# Patient Record
Sex: FEMALE | Race: WHITE | Employment: OTHER | URBAN - NONMETROPOLITAN AREA
[De-identification: names, ages, dates, MRNs, and addresses within clinical notes are randomized per-mention and may not be internally consistent; named-entity substitution may affect disease eponyms.]

---

## 2018-10-31 ENCOUNTER — HOSPITAL ENCOUNTER (OUTPATIENT)
Age: 58
Discharge: HOME | End: 2018-10-31
Payer: COMMERCIAL

## 2018-10-31 ENCOUNTER — OFFICE VISIT (OUTPATIENT)
Dept: FAMILY MEDICINE CLINIC | Age: 58
End: 2018-10-31
Payer: COMMERCIAL

## 2018-10-31 VITALS
HEIGHT: 64 IN | HEART RATE: 100 BPM | WEIGHT: 185 LBS | DIASTOLIC BLOOD PRESSURE: 80 MMHG | TEMPERATURE: 97.6 F | OXYGEN SATURATION: 98 % | BODY MASS INDEX: 31.58 KG/M2 | RESPIRATION RATE: 10 BRPM | SYSTOLIC BLOOD PRESSURE: 128 MMHG

## 2018-10-31 DIAGNOSIS — J45.20 MILD INTERMITTENT ASTHMA WITHOUT COMPLICATION: ICD-10-CM

## 2018-10-31 DIAGNOSIS — R20.2 NUMBNESS AND TINGLING OF UPPER AND LOWER EXTREMITIES OF BOTH SIDES: ICD-10-CM

## 2018-10-31 DIAGNOSIS — D72.10 EOSINOPHILIC DISORDER: ICD-10-CM

## 2018-10-31 DIAGNOSIS — T14.8XXA BRUISING: ICD-10-CM

## 2018-10-31 DIAGNOSIS — M79.605 PAIN IN BOTH LOWER EXTREMITIES: ICD-10-CM

## 2018-10-31 DIAGNOSIS — K90.9 DIARRHEA DUE TO MALABSORPTION: ICD-10-CM

## 2018-10-31 DIAGNOSIS — R41.3 MEMORY DIFFICULTIES: ICD-10-CM

## 2018-10-31 DIAGNOSIS — I10 BENIGN ESSENTIAL HYPERTENSION: ICD-10-CM

## 2018-10-31 DIAGNOSIS — G47.9 SLEEP DIFFICULTIES: ICD-10-CM

## 2018-10-31 DIAGNOSIS — F51.01 PRIMARY INSOMNIA: ICD-10-CM

## 2018-10-31 DIAGNOSIS — R68.83 CHILLS: Primary | ICD-10-CM

## 2018-10-31 DIAGNOSIS — L53.9 REDNESS: ICD-10-CM

## 2018-10-31 DIAGNOSIS — R11.0 NAUSEA: ICD-10-CM

## 2018-10-31 DIAGNOSIS — R20.0 NUMBNESS AND TINGLING OF UPPER AND LOWER EXTREMITIES OF BOTH SIDES: ICD-10-CM

## 2018-10-31 DIAGNOSIS — J30.1 SEASONAL ALLERGIC RHINITIS DUE TO POLLEN: ICD-10-CM

## 2018-10-31 DIAGNOSIS — K20.90 ESOPHAGITIS: ICD-10-CM

## 2018-10-31 DIAGNOSIS — E78.5 DYSLIPIDEMIA: ICD-10-CM

## 2018-10-31 DIAGNOSIS — R68.83 CHILLS: ICD-10-CM

## 2018-10-31 DIAGNOSIS — M16.11 PRIMARY OSTEOARTHRITIS OF RIGHT HIP: ICD-10-CM

## 2018-10-31 DIAGNOSIS — M79.604 PAIN IN BOTH LOWER EXTREMITIES: ICD-10-CM

## 2018-10-31 DIAGNOSIS — E55.9 VITAMIN D DEFICIENCY: ICD-10-CM

## 2018-10-31 DIAGNOSIS — R19.7 DIARRHEA DUE TO MALABSORPTION: ICD-10-CM

## 2018-10-31 DIAGNOSIS — Q80.8 PEELING SKIN SYNDROME: ICD-10-CM

## 2018-10-31 DIAGNOSIS — M48.07 SPINAL STENOSIS OF LUMBOSACRAL REGION: ICD-10-CM

## 2018-10-31 PROBLEM — J30.2 SEASONAL ALLERGIC RHINITIS: Status: ACTIVE | Noted: 2018-07-31

## 2018-10-31 PROBLEM — G47.00 INSOMNIA: Status: ACTIVE | Noted: 2018-10-31

## 2018-10-31 PROBLEM — E11.9 TYPE 2 DIABETES MELLITUS WITHOUT COMPLICATION (HCC): Status: ACTIVE | Noted: 2018-10-31

## 2018-10-31 LAB
ALBUMIN SERPL-MCNC: 4.4 G/DL (ref 3.5–5.1)
ALP BLD-CCNC: 89 U/L (ref 38–126)
ALT SERPL-CCNC: 49 U/L (ref 11–66)
AMYLASE: 95 U/L (ref 20–104)
ANION GAP SERPL CALCULATED.3IONS-SCNC: 16 MEQ/L (ref 8–16)
AST SERPL-CCNC: 28 U/L (ref 5–40)
AVERAGE GLUCOSE: 126 MG/DL (ref 70–126)
BASOPHILS # BLD: 0.5 %
BASOPHILS ABSOLUTE: 0.1 THOU/MM3 (ref 0–0.1)
BILIRUB SERPL-MCNC: 0.2 MG/DL (ref 0.3–1.2)
BILIRUBIN DIRECT: < 0.2 MG/DL (ref 0–0.3)
BILIRUBIN URINE: NEGATIVE
BLOOD, URINE: NEGATIVE
BUN BLDV-MCNC: 12 MG/DL (ref 7–22)
CALCIUM SERPL-MCNC: 9.8 MG/DL (ref 8.5–10.5)
CARBON MONOXIDE, BLOOD: 5.4 % CO SAT
CHARACTER, URINE: CLEAR
CHLORIDE BLD-SCNC: 102 MEQ/L (ref 98–111)
CHOLESTEROL, TOTAL: 253 MG/DL (ref 100–199)
CO2: 22 MEQ/L (ref 23–33)
COLOR: YELLOW
CREAT SERPL-MCNC: 0.7 MG/DL (ref 0.4–1.2)
EOSINOPHIL # BLD: 0.6 %
EOSINOPHILS ABSOLUTE: 0.1 THOU/MM3 (ref 0–0.4)
ERYTHROCYTE [DISTWIDTH] IN BLOOD BY AUTOMATED COUNT: 14.7 % (ref 11.5–14.5)
ERYTHROCYTE [DISTWIDTH] IN BLOOD BY AUTOMATED COUNT: 40.6 FL (ref 35–45)
ESTRADIOL LEVEL: 9.2 PG/ML
FOLLICLE STIMULATING HORMONE: 65.1 MIU/ML (ref 16–160)
GFR SERPL CREATININE-BSD FRML MDRD: 85 ML/MIN/1.73M2
GLUCOSE BLD-MCNC: 99 MG/DL (ref 70–108)
GLUCOSE URINE: NEGATIVE MG/DL
HBA1C MFR BLD: 6.2 % (ref 4.4–6.4)
HCT VFR BLD CALC: 44.1 % (ref 37–47)
HDLC SERPL-MCNC: 68 MG/DL
HEMOGLOBIN: 14.2 GM/DL (ref 12–16)
IMMATURE GRANS (ABS): 0.05 THOU/MM3 (ref 0–0.07)
IMMATURE GRANULOCYTES: 0.5 %
KETONES, URINE: NEGATIVE
LDL CHOLESTEROL CALCULATED: 151 MG/DL
LEUKOCYTE ESTERASE, URINE: NEGATIVE
LIPASE: 53.1 U/L (ref 5.6–51.3)
LUTEINIZING HORMONE: 29.1 MIU/ML (ref 3.3–70.6)
LYMPHOCYTES # BLD: 30.3 %
LYMPHOCYTES ABSOLUTE: 3.2 THOU/MM3 (ref 1–4.8)
MAGNESIUM: 1.9 MG/DL (ref 1.6–2.4)
MCH RBC QN AUTO: 25.1 PG (ref 26–33)
MCHC RBC AUTO-ENTMCNC: 32.2 GM/DL (ref 32.2–35.5)
MCV RBC AUTO: 77.9 FL (ref 81–99)
MONOCYTES # BLD: 5.5 %
MONOCYTES ABSOLUTE: 0.6 THOU/MM3 (ref 0.4–1.3)
NITRITE, URINE: NEGATIVE
NUCLEATED RED BLOOD CELLS: 0 /100 WBC
PH UA: 6
PLATELET # BLD: 345 THOU/MM3 (ref 130–400)
PMV BLD AUTO: 9.9 FL (ref 9.4–12.4)
POTASSIUM SERPL-SCNC: 3.9 MEQ/L (ref 3.5–5.2)
PROGESTERONE LEVEL: < 0.05 NG/ML
PROTEIN UA: NEGATIVE
PTH INTACT: 67.6 PG/ML (ref 15–65)
RBC # BLD: 5.66 MILL/MM3 (ref 4.2–5.4)
RHEUMATOID FACTOR: 10 IU/ML (ref 0–13)
SEDIMENTATION RATE, ERYTHROCYTE: 13 MM/HR (ref 0–20)
SEG NEUTROPHILS: 62.6 %
SEGMENTED NEUTROPHILS ABSOLUTE COUNT: 6.5 THOU/MM3 (ref 1.8–7.7)
SODIUM BLD-SCNC: 140 MEQ/L (ref 135–145)
SPECIFIC GRAVITY, URINE: 1.01 (ref 1–1.03)
T3 TOTAL: 152 NG/DL (ref 72–181)
TOTAL PROTEIN: 7.7 G/DL (ref 6.1–8)
TRIGL SERPL-MCNC: 172 MG/DL (ref 0–199)
TSH SERPL DL<=0.05 MIU/L-ACNC: 0.92 UIU/ML (ref 0.4–4.2)
URIC ACID: 5.5 MG/DL (ref 2.4–5.7)
UROBILINOGEN, URINE: 0.2 EU/DL
VITAMIN D 25-HYDROXY: 67 NG/ML (ref 30–100)
WBC # BLD: 10.4 THOU/MM3 (ref 4.8–10.8)

## 2018-10-31 PROCEDURE — 85025 COMPLETE CBC W/AUTO DIFF WBC: CPT

## 2018-10-31 PROCEDURE — 84443 ASSAY THYROID STIM HORMONE: CPT

## 2018-10-31 PROCEDURE — G8427 DOCREV CUR MEDS BY ELIG CLIN: HCPCS | Performed by: FAMILY MEDICINE

## 2018-10-31 PROCEDURE — 84403 ASSAY OF TOTAL TESTOSTERONE: CPT

## 2018-10-31 PROCEDURE — 86376 MICROSOMAL ANTIBODY EACH: CPT

## 2018-10-31 PROCEDURE — 83970 ASSAY OF PARATHORMONE: CPT

## 2018-10-31 PROCEDURE — 83002 ASSAY OF GONADOTROPIN (LH): CPT

## 2018-10-31 PROCEDURE — 86038 ANTINUCLEAR ANTIBODIES: CPT

## 2018-10-31 PROCEDURE — 82306 VITAMIN D 25 HYDROXY: CPT

## 2018-10-31 PROCEDURE — G8484 FLU IMMUNIZE NO ADMIN: HCPCS | Performed by: FAMILY MEDICINE

## 2018-10-31 PROCEDURE — 86628 CANDIDA ANTIBODY: CPT

## 2018-10-31 PROCEDURE — 99204 OFFICE O/P NEW MOD 45 MIN: CPT | Performed by: FAMILY MEDICINE

## 2018-10-31 PROCEDURE — 84436 ASSAY OF TOTAL THYROXINE: CPT

## 2018-10-31 PROCEDURE — 84550 ASSAY OF BLOOD/URIC ACID: CPT

## 2018-10-31 PROCEDURE — 80061 LIPID PANEL: CPT

## 2018-10-31 PROCEDURE — 83036 HEMOGLOBIN GLYCOSYLATED A1C: CPT

## 2018-10-31 PROCEDURE — 86060 ANTISTREPTOLYSIN O TITER: CPT

## 2018-10-31 PROCEDURE — 1036F TOBACCO NON-USER: CPT | Performed by: FAMILY MEDICINE

## 2018-10-31 PROCEDURE — 83090 ASSAY OF HOMOCYSTEINE: CPT

## 2018-10-31 PROCEDURE — 83690 ASSAY OF LIPASE: CPT

## 2018-10-31 PROCEDURE — 3017F COLORECTAL CA SCREEN DOC REV: CPT | Performed by: FAMILY MEDICINE

## 2018-10-31 PROCEDURE — 86430 RHEUMATOID FACTOR TEST QUAL: CPT

## 2018-10-31 PROCEDURE — 84480 ASSAY TRIIODOTHYRONINE (T3): CPT

## 2018-10-31 PROCEDURE — 83001 ASSAY OF GONADOTROPIN (FSH): CPT

## 2018-10-31 PROCEDURE — 82627 DEHYDROEPIANDROSTERONE: CPT

## 2018-10-31 PROCEDURE — 83516 IMMUNOASSAY NONANTIBODY: CPT

## 2018-10-31 PROCEDURE — 82375 ASSAY CARBOXYHB QUANT: CPT

## 2018-10-31 PROCEDURE — 84270 ASSAY OF SEX HORMONE GLOBUL: CPT

## 2018-10-31 PROCEDURE — 36415 COLL VENOUS BLD VENIPUNCTURE: CPT

## 2018-10-31 PROCEDURE — 84144 ASSAY OF PROGESTERONE: CPT

## 2018-10-31 PROCEDURE — 82248 BILIRUBIN DIRECT: CPT

## 2018-10-31 PROCEDURE — 86141 C-REACTIVE PROTEIN HS: CPT

## 2018-10-31 PROCEDURE — 81003 URINALYSIS AUTO W/O SCOPE: CPT

## 2018-10-31 PROCEDURE — G8417 CALC BMI ABV UP PARAM F/U: HCPCS | Performed by: FAMILY MEDICINE

## 2018-10-31 PROCEDURE — 82626 DEHYDROEPIANDROSTERONE: CPT

## 2018-10-31 PROCEDURE — 85651 RBC SED RATE NONAUTOMATED: CPT

## 2018-10-31 PROCEDURE — 83735 ASSAY OF MAGNESIUM: CPT

## 2018-10-31 PROCEDURE — 82670 ASSAY OF TOTAL ESTRADIOL: CPT

## 2018-10-31 PROCEDURE — 80053 COMPREHEN METABOLIC PANEL: CPT

## 2018-10-31 PROCEDURE — 82150 ASSAY OF AMYLASE: CPT

## 2018-10-31 RX ORDER — FUROSEMIDE 40 MG/1
TABLET ORAL
Refills: 11 | COMMUNITY
Start: 2018-09-30

## 2018-10-31 RX ORDER — ZOLPIDEM TARTRATE 5 MG/1
TABLET ORAL
COMMUNITY

## 2018-10-31 RX ORDER — POTASSIUM CHLORIDE 20 MEQ/1
TABLET, EXTENDED RELEASE ORAL
Refills: 11 | COMMUNITY
Start: 2018-09-30

## 2018-10-31 RX ORDER — GLUCOSAMINE SULFATE DIPOT CHLR 1000 MG
1 TABLET ORAL 2 TIMES DAILY
COMMUNITY

## 2018-10-31 RX ORDER — RANITIDINE 150 MG/1
150 TABLET ORAL 2 TIMES DAILY
COMMUNITY

## 2018-10-31 RX ORDER — FLUTICASONE PROPIONATE 50 MCG
1 SPRAY, SUSPENSION (ML) NASAL DAILY
COMMUNITY

## 2018-10-31 RX ORDER — GUAIFENESIN, PSEUDOEPHEDRINE HYDROCHLORIDE 600; 60 MG/1; MG/1
1 TABLET, EXTENDED RELEASE ORAL PRN
COMMUNITY

## 2018-10-31 RX ORDER — LISINOPRIL 40 MG/1
TABLET ORAL
COMMUNITY

## 2018-10-31 RX ORDER — FEXOFENADINE HCL 180 MG/1
180 TABLET ORAL PRN
COMMUNITY

## 2018-10-31 ASSESSMENT — ENCOUNTER SYMPTOMS
COUGH: 1
DIARRHEA: 1
ABDOMINAL PAIN: 1
SHORTNESS OF BREATH: 1
COLOR CHANGE: 1
CONSTIPATION: 1
NAUSEA: 1
ABDOMINAL DISTENTION: 1
WHEEZING: 1

## 2018-10-31 ASSESSMENT — PATIENT HEALTH QUESTIONNAIRE - PHQ9
2. FEELING DOWN, DEPRESSED OR HOPELESS: 0
SUM OF ALL RESPONSES TO PHQ QUESTIONS 1-9: 0
SUM OF ALL RESPONSES TO PHQ9 QUESTIONS 1 & 2: 0
SUM OF ALL RESPONSES TO PHQ QUESTIONS 1-9: 0
1. LITTLE INTEREST OR PLEASURE IN DOING THINGS: 0

## 2018-10-31 NOTE — PATIENT INSTRUCTIONS
1. You may receive a survey about your visit with us today. The feedback from our patients helps us identify what is working well and where the service to all patients can be enhanced. Thank you! 2. Please bring in ALL medications BOTTLES, including inhalers, herbal supplements, over the counter, prescribed & non-prescribed medicine. The office would like actual medication bottles and a list.  3. Please note our OFFICE POLICIES:  a. Prior to getting your labs drawn, please check with your insurance company for benefits and eligibility of lab services. Often, insurance companies cover certain tests for preventative visits only. It is patient's responsibility to see what is covered. b. We are unable to change a diagnosis after the test has been performed. c. Lab orders will not be re-printed. Please hold onto your original lab orders and take them to your lab to be completed. d. If you no show your schedule appointment three times, you be dismissed from this practice. e. Woodard Furrow must be completed 24 hours prior to your schedule appointment. 4. If the list below has been completed, PLEASE FAX RECORDS TO OUR OFFICE @ [phone]. Once the records have been received we will update your records at our office. Health Maintenance Due   Topic Date Due    Hepatitis C screen  1960    HIV screen  07/28/1973    Cervical cancer screen  07/28/1979    Lipid screen  07/28/1998    Breast cancer screen  07/28/2008    Shingles Vaccine (1 of 2 - 2 Dose Series) 07/28/2008    Colon cancer screen colonoscopy  07/28/2008    Flu vaccine (1) 09/01/2018       Schedule your 2018 flu vaccine with Dr. Adore Allen call [phone]!   [facility], for anyone 9 years or older   [address]   Every Monday   8:00am-11:00am and 1:00pm-3:00pm

## 2018-10-31 NOTE — PROGRESS NOTES
intermittent asthma without complication    12. Benign essential hypertension    13. Diarrhea due to malabsorption    14. Dyslipidemia    15. Eosinophilic disorder    16. Primary insomnia    17. Primary osteoarthritis of right hip    18. Seasonal allergic rhinitis due to pollen    19. Spinal stenosis of lumbosacral region    20. Vitamin D deficiency            Review of Systems   Constitutional: Positive for chills and fatigue. Eyes: Positive for visual disturbance. Respiratory: Positive for cough, shortness of breath and wheezing. Cardiovascular: Positive for chest pain, palpitations and leg swelling. Gastrointestinal: Positive for abdominal distention, abdominal pain, constipation, diarrhea and nausea. Endocrine: Positive for cold intolerance. Genitourinary: Positive for frequency. Musculoskeletal: Positive for arthralgias, gait problem, joint swelling and myalgias. Skin: Positive for color change and rash. Allergic/Immunologic: Positive for environmental allergies. Neurological: Positive for dizziness, weakness, light-headedness and numbness. Hematological: Bruises/bleeds easily. Psychiatric/Behavioral: Positive for decreased concentration, dysphoric mood and sleep disturbance. The patient is nervous/anxious. All other systems reviewed and are negative. Prior to Visit Medications    Medication Sig Taking? Authorizing Provider   potassium chloride (KLOR-CON M) 20 MEQ extended release tablet TAKE ONE TABLET BY MOUTH TWO TIMES A DAY AS DIRECTED Yes Historical Provider, MD   furosemide (LASIX) 40 MG tablet TAKE ONE TABLET BY MOUTH ONCE A DAY AS DIRECTED Yes Historical Provider, MD   lisinopril (PRINIVIL;ZESTRIL) 40 MG tablet lisinopril 40 mg tablet   Take 1 tablet every day by oral route for 30 days.  Yes Historical Provider, MD   zolpidem (AMBIEN) 5 MG tablet zolpidem 5 mg tablet   take 1 tab by mouth at bedtime Yes Historical Provider, MD   Fluticasone-Umeclidin-Vilant (Daiva Lambert directs them for the most cost effective lab draw with the least patient's cost  - Yolie Mt  will be scheduled subsequent to those results. John Mahoney will bring in her drink and food log to her next visit    Chronic Problems Addressed on this Visit:                                   1.  Intensity of Service; Uncontrolled items at this visit; Chief Complaint   Patient presents with    Established New Doctor     luis e arellano    Asthma     esosinophil- dr. 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 Patient's foods and drinks were reviewed with the patient,       - Yolie Ann will bring food+drink symptom log to next visit for inclusion in their record      - 75 better food list reviewed & given to patient with the omega 6 food list to avoid         - Gluten in corn and oats abstracts sheet reviewed and given to the patient today   3. Greater than 45 minutes were spent face to face on this visit of which >50% was for counseling and coordination of care. Patient's foods, drinks and supplements were reviewed with the patient,   - they will bring a food drink symptom log to future visits for inclusion in their record    - 75 better food list reviewed & given to patient along with the omega 6 food list to avoid      - Gluten in corn and oats abstracts sheet reviewed and given to the patient today    - 51 Foods containing Latex-like proteins was reviewed and copy given to the patient     - Nutrient Supplements list provided and copy given to the patient     - [email or web address]. [employer] web site offered to patient to review at their convenience by staff with login [email or web address] time released balanced B complex   - Cinnamon bark 500 mg with or without Chromium but not extract (ineffective)  - Calcium carbonate 1000 mg, Magnesium oxide 500 mg, Vit D 3 best as individual  - Magnesium oxide 250 mg, 400 mg, or 500 mg tabs if < 2 bowel movements daily  - Multivitamin Seniors for most patients, One Daily or Item with iron  - Vit D 3  1,000IU ,   2,000 IU,  5,000 IU, can start low and buy larger on 2nd bottle     Some brands containing or derived from soy oil or corn oil are OK if not allergic to soy    Nutrients for Special Needs by Mail order for less expense;  - Elemental Iron 65 mg tabs if need more iron for low iron on labs    Usually turns bowel movements grey, green or black but not a concern  - Time released Niacin 250 mg for cold intolerance, low libido or impotence  - DHEA 10 mg, 25 mg, or 50 mg for improving DHEA levels on labs if having Fatigue    If stools too loose substitute for your Magnesium oxide using;   Magnesium citrate 200 mg tabs(NOT liquid, NOT caps) amazon. com  Magnesium gluconate 550 mg by [employer] at [employer]  Magnesium chloride foot soaks or body sprays  [email or web address]   Magnesium chloride flakes for soaks, or magnesium spray or cream    Food Drink Symptom Log;  I asked this patient to track these items and any other symptoms on their list on a weekly basis to document their progress or lack of same.  This can be done on the symptom tracking sheet available to them at today's visit but looks like this:                                                      Rate on scale of 0-10 with zero = not noticeable  Symptom:                            Week 1               2                 3                 4               Etc            Hair loss    Foot cramps    Paresthesia    Aches    IBS (irritable bowel)    Constipation    Diarrhea  Nocturia    (up to bathroom at night)    Fatigue/Energy level    Stress      On the other side of the sheet they can track their food, drink, environment, activity, symptoms etc      Avoiding Latex-like proteins in my foods; Avocados, Bananas, Celery, Figs & Kiwi proteins have latex-like proteins to inflame our immune systems, see the 51 latex-like foods list  How Can I Have A Latex Allergy? Eating foods with latex-like protein exposes us to latex allergies. Our body cannot tell the difference between these latex-like proteins and latex from rubber products since many people are allergic to fruit, vegetables and latex. Read labels on pre-packaged foods. This list to avoid is only a guide if you are known allergic to latex or have a latex rash on your chin, cheeks and lines on your neck and chest. The amount of latex is different in each food product or fruit variety. Foods to Avoid out of Season if not grown locally: Melon, Nectarine, Papaya, Cherry, Passion fruit, Plum, Chestnuts, and Tomato. Avocado, Banana, Celery, Figs, and Kiwi always contain Latex-like protein and are almost universally toxic    Whats in Season? Strawberries taste better in June than December because June is strawberry season so buy locally grown produce \"in season\" for the best flavor, cost and less Latex. Locally grown produce not only tastes great requires little of no ethylene exposure in food distribution so has less latex content. Out of season, use canned, frozen or dried since processed ripe and are latex lower!!!   Month     [place] Locally Grown Produce  January, February, March: use canned, frozen or dried fruits since lower in latex  April; asparagus, radishes  May; asparagus, broccoli, green onions, greens, peas, radishes, rhubarb  June; asparagus, beets, beans, broccoli, cabbage, cantaloupe, carrots, green onions, greens, lettuce, onions, parsley, peas, radishes, rhubarb, strawberries, watermelons  July; beans, beets, blueberries, broccoli, cabbage, cantaloupe, carrots, cauliflower, celery, cucumbers, eggplant, grapes, green onions, greens, lettuce,

## 2018-11-01 ENCOUNTER — PATIENT MESSAGE (OUTPATIENT)
Dept: FAMILY MEDICINE CLINIC | Age: 58
End: 2018-11-01

## 2018-11-01 LAB
ANTISTREPTOLYSIN-O: 145.4 IU/ML (ref 0–200)
THYROXINE (T4): 10.4 UG/DL (ref 4.5–12)

## 2018-11-03 LAB
ANA SCREEN: NORMAL
C-REACTIVE PROTEIN, HIGH SENSITIVITY: 6 MG/L
DHEAS (DHEA SULFATE): 92 UG/DL (ref 13–130)
GLIADIN PEPTIDE IGG, IGA: NORMAL
HOMOCYSTEINE, TOTAL: 11 UMOL/L
THYROID PEROXIDASE ANTIBODY: 0.7 IU/ML (ref 0–9)

## 2018-11-06 LAB — CANDIDA ANTIBODIES, QUAL: NORMAL

## 2018-11-07 ENCOUNTER — PATIENT MESSAGE (OUTPATIENT)
Dept: FAMILY MEDICINE CLINIC | Age: 58
End: 2018-11-07

## 2018-11-07 LAB
DHEA UNCONJUGATED: 1.82 NG/ML (ref 0.63–4.7)
TESTOSTERONE, FREE W SHGB, FEMALES/CHILDREN: NORMAL

## 2018-11-07 NOTE — TELEPHONE ENCOUNTER
Mary Kay Robledo [phone] (M), called needing clarification on the fish oil. Patient states no future questions or concerns at this time. Sent her my chart of the fish oil.